# Patient Record
Sex: MALE | URBAN - METROPOLITAN AREA
[De-identification: names, ages, dates, MRNs, and addresses within clinical notes are randomized per-mention and may not be internally consistent; named-entity substitution may affect disease eponyms.]

---

## 2020-06-10 ENCOUNTER — OFFICE VISIT (OUTPATIENT)
Dept: PRIMARY CARE CLINIC | Age: 45
End: 2020-06-10

## 2020-06-10 ENCOUNTER — HOSPITAL ENCOUNTER (OUTPATIENT)
Age: 45
Setting detail: SPECIMEN
Discharge: HOME | End: 2020-06-10
Payer: COMMERCIAL

## 2020-06-10 PROCEDURE — 99211 OFF/OP EST MAY X REQ PHY/QHP: CPT | Performed by: FAMILY MEDICINE

## 2020-06-10 PROCEDURE — U0002 COVID-19 LAB TEST NON-CDC: HCPCS

## 2020-06-11 LAB
SARS-COV-2: NOT DETECTED
SOURCE: NORMAL

## 2020-06-13 ENCOUNTER — CARE COORDINATION (OUTPATIENT)
Dept: CASE MANAGEMENT | Age: 45
End: 2020-06-13

## 2020-06-13 NOTE — CARE COORDINATION
Yellow alert noted in [employer] remote symptom monitoring program. Messaged patient to notify Bob Goltz if symptoms have worsened since yesterday. Mele Ghotra RN, 8:53 AM   Just wanted to let you know that The [facility] at [facility] ([facility]) has received your message. Thanks for letting us know. Please let us know if any of your symptoms are worse than yesterday, or if you have any questions or concerns and would like to speak with a nurse. We are available 8-4 M-F, and 9-1 S/S.  My number is [phone]         Mele Ghotra RN

## 2020-06-19 VITALS — OXYGEN SATURATION: 98 % | TEMPERATURE: 96.9 F | HEART RATE: 77 BPM
